# Patient Record
Sex: FEMALE | Race: WHITE | NOT HISPANIC OR LATINO | Employment: OTHER | URBAN - METROPOLITAN AREA
[De-identification: names, ages, dates, MRNs, and addresses within clinical notes are randomized per-mention and may not be internally consistent; named-entity substitution may affect disease eponyms.]

---

## 2024-04-08 ENCOUNTER — HOSPITAL ENCOUNTER (EMERGENCY)
Facility: HOSPITAL | Age: 24
Discharge: HOME | End: 2024-04-08
Attending: STUDENT IN AN ORGANIZED HEALTH CARE EDUCATION/TRAINING PROGRAM | Admitting: STUDENT IN AN ORGANIZED HEALTH CARE EDUCATION/TRAINING PROGRAM
Payer: COMMERCIAL

## 2024-04-08 VITALS
HEIGHT: 62 IN | DIASTOLIC BLOOD PRESSURE: 73 MMHG | RESPIRATION RATE: 18 BRPM | OXYGEN SATURATION: 100 % | BODY MASS INDEX: 23.85 KG/M2 | TEMPERATURE: 98.4 F | HEART RATE: 99 BPM | WEIGHT: 129.63 LBS | SYSTOLIC BLOOD PRESSURE: 110 MMHG

## 2024-04-08 DIAGNOSIS — R55 VASOVAGAL SYNCOPE: Primary | ICD-10-CM

## 2024-04-08 DIAGNOSIS — N39.0 ACUTE UTI: ICD-10-CM

## 2024-04-08 LAB
ALBUMIN SERPL-MCNC: 4.2 G/DL (ref 3.5–5.2)
ALBUMIN/GLOB SERPL: 1 G/DL
ALP SERPL-CCNC: 82 U/L (ref 39–117)
ALT SERPL W P-5'-P-CCNC: 14 U/L (ref 1–33)
ANION GAP SERPL CALCULATED.3IONS-SCNC: 9 MMOL/L (ref 5–15)
AST SERPL-CCNC: 21 U/L (ref 1–32)
B-HCG UR QL: NEGATIVE
BACTERIA UR QL AUTO: ABNORMAL /HPF
BASOPHILS # BLD AUTO: 0.02 10*3/MM3 (ref 0–0.2)
BASOPHILS NFR BLD AUTO: 0.2 % (ref 0–1.5)
BILIRUB SERPL-MCNC: 0.2 MG/DL (ref 0–1.2)
BILIRUB UR QL STRIP: NEGATIVE
BUN SERPL-MCNC: 15 MG/DL (ref 6–20)
BUN/CREAT SERPL: 20.8 (ref 7–25)
CALCIUM SPEC-SCNC: 9.3 MG/DL (ref 8.6–10.5)
CHLORIDE SERPL-SCNC: 102 MMOL/L (ref 98–107)
CLARITY UR: ABNORMAL
CO2 SERPL-SCNC: 26 MMOL/L (ref 22–29)
COLOR UR: YELLOW
CREAT SERPL-MCNC: 0.72 MG/DL (ref 0.57–1)
DEPRECATED RDW RBC AUTO: 54.4 FL (ref 37–54)
EGFRCR SERPLBLD CKD-EPI 2021: 119.9 ML/MIN/1.73
EOSINOPHIL # BLD AUTO: 0.03 10*3/MM3 (ref 0–0.4)
EOSINOPHIL NFR BLD AUTO: 0.4 % (ref 0.3–6.2)
ERYTHROCYTE [DISTWIDTH] IN BLOOD BY AUTOMATED COUNT: 16.4 % (ref 12.3–15.4)
EXPIRATION DATE: NORMAL
GLOBULIN UR ELPH-MCNC: 4.3 GM/DL
GLUCOSE SERPL-MCNC: 97 MG/DL (ref 65–99)
GLUCOSE UR STRIP-MCNC: NEGATIVE MG/DL
HCT VFR BLD AUTO: 40.3 % (ref 34–46.6)
HGB BLD-MCNC: 12.8 G/DL (ref 12–15.9)
HGB UR QL STRIP.AUTO: ABNORMAL
HYALINE CASTS UR QL AUTO: ABNORMAL /LPF
IMM GRANULOCYTES # BLD AUTO: 0.03 10*3/MM3 (ref 0–0.05)
IMM GRANULOCYTES NFR BLD AUTO: 0.4 % (ref 0–0.5)
INTERNAL NEGATIVE CONTROL: NEGATIVE
INTERNAL POSITIVE CONTROL: POSITIVE
KETONES UR QL STRIP: NEGATIVE
LEUKOCYTE ESTERASE UR QL STRIP.AUTO: ABNORMAL
LYMPHOCYTES # BLD AUTO: 0.59 10*3/MM3 (ref 0.7–3.1)
LYMPHOCYTES NFR BLD AUTO: 7 % (ref 19.6–45.3)
Lab: NORMAL
MCH RBC QN AUTO: 28.6 PG (ref 26.6–33)
MCHC RBC AUTO-ENTMCNC: 31.8 G/DL (ref 31.5–35.7)
MCV RBC AUTO: 90 FL (ref 79–97)
MONOCYTES # BLD AUTO: 0.41 10*3/MM3 (ref 0.1–0.9)
MONOCYTES NFR BLD AUTO: 4.8 % (ref 5–12)
NEUTROPHILS NFR BLD AUTO: 7.39 10*3/MM3 (ref 1.7–7)
NEUTROPHILS NFR BLD AUTO: 87.2 % (ref 42.7–76)
NITRITE UR QL STRIP: NEGATIVE
NRBC BLD AUTO-RTO: 0 /100 WBC (ref 0–0.2)
PH UR STRIP.AUTO: 5.5 [PH] (ref 5–8)
PLATELET # BLD AUTO: 484 10*3/MM3 (ref 140–450)
PMV BLD AUTO: 10.2 FL (ref 6–12)
POTASSIUM SERPL-SCNC: 4.6 MMOL/L (ref 3.5–5.2)
PROT SERPL-MCNC: 8.5 G/DL (ref 6–8.5)
PROT UR QL STRIP: ABNORMAL
RBC # BLD AUTO: 4.48 10*6/MM3 (ref 3.77–5.28)
RBC # UR STRIP: ABNORMAL /HPF
REF LAB TEST METHOD: ABNORMAL
SODIUM SERPL-SCNC: 137 MMOL/L (ref 136–145)
SP GR UR STRIP: 1.02 (ref 1–1.03)
SQUAMOUS #/AREA URNS HPF: ABNORMAL /HPF
TROPONIN T SERPL HS-MCNC: <6 NG/L
UROBILINOGEN UR QL STRIP: ABNORMAL
WBC # UR STRIP: ABNORMAL /HPF
WBC NRBC COR # BLD AUTO: 8.47 10*3/MM3 (ref 3.4–10.8)

## 2024-04-08 PROCEDURE — 99283 EMERGENCY DEPT VISIT LOW MDM: CPT

## 2024-04-08 PROCEDURE — 84484 ASSAY OF TROPONIN QUANT: CPT | Performed by: PHYSICIAN ASSISTANT

## 2024-04-08 PROCEDURE — 25810000003 SODIUM CHLORIDE 0.9 % SOLUTION: Performed by: PHYSICIAN ASSISTANT

## 2024-04-08 PROCEDURE — 85025 COMPLETE CBC W/AUTO DIFF WBC: CPT | Performed by: PHYSICIAN ASSISTANT

## 2024-04-08 PROCEDURE — 81025 URINE PREGNANCY TEST: CPT | Performed by: PHYSICIAN ASSISTANT

## 2024-04-08 PROCEDURE — 80053 COMPREHEN METABOLIC PANEL: CPT | Performed by: PHYSICIAN ASSISTANT

## 2024-04-08 PROCEDURE — 81001 URINALYSIS AUTO W/SCOPE: CPT | Performed by: PHYSICIAN ASSISTANT

## 2024-04-08 PROCEDURE — 93005 ELECTROCARDIOGRAM TRACING: CPT | Performed by: PHYSICIAN ASSISTANT

## 2024-04-08 RX ORDER — ONDANSETRON 4 MG/1
4 TABLET, ORALLY DISINTEGRATING ORAL EVERY 8 HOURS PRN
Qty: 12 TABLET | Refills: 0 | Status: SHIPPED | OUTPATIENT
Start: 2024-04-08

## 2024-04-08 RX ORDER — SODIUM CHLORIDE 0.9 % (FLUSH) 0.9 %
10 SYRINGE (ML) INJECTION AS NEEDED
Status: DISCONTINUED | OUTPATIENT
Start: 2024-04-08 | End: 2024-04-08 | Stop reason: HOSPADM

## 2024-04-08 RX ORDER — CEFDINIR 300 MG/1
300 CAPSULE ORAL 2 TIMES DAILY
Qty: 20 CAPSULE | Refills: 0 | Status: SHIPPED | OUTPATIENT
Start: 2024-04-08

## 2024-04-08 RX ADMIN — SODIUM CHLORIDE 1000 ML: 9 INJECTION, SOLUTION INTRAVENOUS at 11:49

## 2024-04-08 NOTE — ED PROVIDER NOTES
Subjective   History of Present Illness  23-year-old female presents emergency department today with a syncopal episode.  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  She had previous syncopal episodes in the past.  Patient reports she got to get up to go to one of the couches and passed out again.    History provided by:  Patient   used: No    Syncope  Episode history:  Single  Most recent episode:  Today  Duration:  30 seconds  Progression:  Resolved  Chronicity:  New  Context comment:  Got nauseated had a syncopal episode.  Tried to get up too quickly and passed out again.  Witnessed: no    Relieved by:  Lying down  Worsened by:  Nothing  Ineffective treatments:  None tried  Associated symptoms: nausea    Associated symptoms: no anxiety, no headaches and no vomiting    Risk factors: no congenital heart disease, no coronary artery disease and no vascular disease        Review of Systems   Cardiovascular:  Positive for syncope.   Gastrointestinal:  Positive for nausea. Negative for vomiting.   Neurological:  Negative for headaches.       No past medical history on file.    No Known Allergies    No past surgical history on file.    No family history on file.    Social History     Socioeconomic History    Marital status: Single           Objective   Physical Exam  Vitals and nursing note reviewed.   Constitutional:       General: She is not in acute distress.     Appearance: She is well-developed. She is not diaphoretic.   HENT:      Head: Normocephalic and atraumatic.      Nose: Nose normal.   Eyes:      General: No scleral icterus.     Conjunctiva/sclera: Conjunctivae normal.   Cardiovascular:      Rate and Rhythm: Normal rate and regular rhythm.      Heart sounds: Normal heart sounds. No murmur heard.  Pulmonary:      Effort: Pulmonary effort is normal. No respiratory distress.       Breath sounds: Normal breath sounds.   Abdominal:      General: Bowel sounds are normal.      Palpations: Abdomen is soft.      Tenderness: There is no abdominal tenderness.   Musculoskeletal:         General: Normal range of motion.      Cervical back: Normal range of motion and neck supple.   Skin:     General: Skin is warm and dry.   Neurological:      Mental Status: She is alert and oriented to person, place, and time.   Psychiatric:         Behavior: Behavior normal.         Procedures           ED Course  ED Course as of 04/09/24 2113 Tue Apr 09, 2024 2112 Laboratory data patient's white count was 8.47 with an H&H of 13 and 40.  Platelet count of 484.  CMP glucose of 87 BUN of 15 creatinine 1.72 sodium 137 and a potassium of 4.6.  Liver functions ALT 14 AST of 21 alk phos 82 and total bilirubin 0.2.  Urinalysis did appear to have a UTI.  She had leukocyte Estrace TNTC WBCs +2 bacteria 3-5 RBCs and only 7-12 epithelials.  Urine cultures pending.  Urine hCG was negative.  EKG revealed normal sinus rhythm. [TILA]   2113 She appears to have an acute UTI.  Also appears to have vasovagal syncope.  She feels much improved like to be discharged home started on oral antibiotics. [TILA]      ED Course User Index  [TILA] Ilya Stroud PA                                             Medical Decision Making  Problems Addressed:  Acute UTI: complicated acute illness or injury  Vasovagal syncope: complicated acute illness or injury    Amount and/or Complexity of Data Reviewed  Labs: ordered.  ECG/medicine tests: ordered.    Risk  Prescription drug management.        Final diagnoses:   Vasovagal syncope   Acute UTI       ED Disposition  ED Disposition       ED Disposition   Discharge    Condition   Stable    Comment   --               PATIENT CONNECTION - [facility] [id number] [phone]    Call for appointment         Medication List        New Prescriptions      cefdinir 300 MG capsule  Commonly  known as: OMNICEF  Take 1 capsule by mouth 2 (Two) Times a Day.     ondansetron ODT 4 MG disintegrating tablet  Commonly known as: ZOFRAN-ODT  Place 1 tablet on the tongue Every 8 (Eight) Hours As Needed for Nausea.               Where to Get Your Medications        These medications were sent to [id number] - [place], [place] - [address] AT [facility] [address] [facility] [phone]  - [phone]   [address] [facility] [address]      Phone: [phone]   cefdinir 300 MG capsule  ondansetron ODT 4 MG disintegrating tablet            Ilya Stroud PA  04/09/24 [id number]

## 2024-04-09 LAB
QT INTERVAL: 374 MS
QTC INTERVAL: 457 MS

## 2024-07-10 ENCOUNTER — TELEMEDICINE (OUTPATIENT)
Dept: FAMILY MEDICINE CLINIC | Facility: TELEHEALTH | Age: 24
End: 2024-07-10
Payer: COMMERCIAL

## 2024-07-10 DIAGNOSIS — L30.9 ECZEMA OF SCALP: Primary | ICD-10-CM

## 2024-07-10 PROBLEM — M95.0 ACQUIRED DEFORMITY OF NOSE: Status: ACTIVE | Noted: 2023-03-06

## 2024-07-10 PROBLEM — F41.9 MODERATE ANXIETY: Chronic | Status: ACTIVE | Noted: 2022-10-13

## 2024-07-10 PROBLEM — R76.0 ANTIPHOSPHOLIPID ANTIBODY POSITIVE: Status: ACTIVE | Noted: 2017-02-07

## 2024-07-10 PROBLEM — M32.9 LUPUS: Status: ACTIVE | Noted: 2024-07-10

## 2024-07-10 PROBLEM — IMO0002 LUPUS: Status: ACTIVE | Noted: 2024-07-10

## 2024-07-10 PROBLEM — R41.840 LACK OF CONCENTRATION: Status: ACTIVE | Noted: 2022-10-13

## 2024-07-10 PROBLEM — S02.2XXA CLOSED FRACTURE OF NASAL BONES: Status: ACTIVE | Noted: 2023-07-19

## 2024-07-10 PROBLEM — Z01.419 WELL WOMAN EXAM: Status: ACTIVE | Noted: 2022-10-13

## 2024-07-10 PROBLEM — J34.2 DEVIATED NASAL SEPTUM: Status: ACTIVE | Noted: 2023-03-06

## 2024-07-10 PROBLEM — M35.9 UNDIFFERENTIATED CONNECTIVE TISSUE DISEASE: Status: ACTIVE | Noted: 2017-01-23

## 2024-07-10 PROBLEM — R76.8 ANA POSITIVE: Status: ACTIVE | Noted: 2017-01-23

## 2024-07-10 PROCEDURE — 99213 OFFICE O/P EST LOW 20 MIN: CPT | Performed by: NURSE PRACTITIONER

## 2024-07-10 RX ORDER — KETOCONAZOLE 20 MG/ML
SHAMPOO TOPICAL WEEKLY
COMMUNITY
Start: 2024-06-29

## 2024-07-10 RX ORDER — HYDROXYCHLOROQUINE SULFATE 200 MG/1
200 TABLET, FILM COATED ORAL DAILY
COMMUNITY
End: 2024-07-10

## 2024-07-10 RX ORDER — CLOTRIMAZOLE 10 MG/G
OINTMENT TOPICAL
COMMUNITY
Start: 2024-06-12 | End: 2024-07-10

## 2024-07-10 RX ORDER — METRONIDAZOLE 500 MG/1
500 TABLET ORAL 2 TIMES DAILY
COMMUNITY
Start: 2024-07-08 | End: 2024-07-15

## 2024-07-10 NOTE — PROGRESS NOTES
CHIEF COMPLAINT  Chief Complaint   Patient presents with    Rash         HPI  Nataly Olivier is a 24 y.o. female  presents with complaint of eczema of her scalp that is painful and oozing. She has been using betamethasone cream, but ran out. The ketoconazole shampoo is not helping.   She has a history of eczema.   She has an appointment to see her dermatologist 8/26 and cannot wait that long for treatment. The bottle she had gotten before was very small.     Review of Systems   Skin:  Positive for rash (scalp).       No past medical history on file.    No family history on file.    Social History     Socioeconomic History    Marital status: Single         There were no vitals taken for this visit.    PHYSICAL EXAM  Physical Exam   Constitutional: She is oriented to person, place, and time. She appears well-developed and well-nourished. She does not have a sickly appearance. She does not appear ill. No distress.   HENT:   Head: Normocephalic and atraumatic.   Eyes: EOM are normal.   Neck: Neck normal appearance.  Pulmonary/Chest: Effort normal.  No respiratory distress.  Neurological: She is alert and oriented to person, place, and time.   Skin: Skin is dry. Rash (scalp) noted.   Psychiatric: She has a normal mood and affect.           Diagnoses and all orders for this visit:    1. Eczema of scalp (Primary)    Other orders  -     betamethasone valerate (VALISONE) 0.1 % ointment; Apply  topically to the appropriate area as directed 2 (Two) Times a Day.  Dispense: 45 g; Refill: 1    Dermatology follow up 8/26    The use of a video visit has been reviewed with the patient and verbal informed consent has been obtained. Myself and Nataly Olivier participated in this visit. The patient is located in [address] [place] [address]. I am located in [place], [place]. Mychart and [employer] were utilized.       Note Disclaimer: At [facility], we believe that sharing information builds trust and better    relationships. You are receiving this note because you recently visited [facility]. It is possible you   will see health information before a provider has talked with you about it. This kind of information can   be easy to misunderstand. To help you fully understand what it means for your health, we urge you to   discuss this note with your provider.    Bertha Salazar, DEVON  07/10/2024  13:00 EDT

## 2024-07-10 NOTE — PATIENT INSTRUCTIONS
Follow up with your dermatologist for further treatment   May use the ketoconazole shampoo and betamethasone cream together  If symptoms worsen before seeing dermatology, follow up at urgent care or with your primary care provider       Clinical References    Seborrheic Dermatitis, Adult  Seborrheic dermatitis is a skin disease that causes red, scaly patches. It often occurs on the scalp, where it may be called dandruff. The patches may also appear on other parts of the body. Skin patches tend to occur where there are a lot of oil glands in the skin. Areas of the body that may be affected include:  The scalp.  The face, eyebrows, and ears.  The area around a beard.  Skin folds of the body. This includes the armpits, groin, and buttocks.  The chest.  The condition is often long-lasting (chronic). It may come and go for no known reason. It may be activated by a trigger, such as:  Cold weather.  Being out in the sun.  Stress.  Drinking alcohol.  What are the causes?  The cause of this condition is not known. It may be related to having too much yeast on the skin or changes in how your body's disease-fighting system (immune system) works.  What increases the risk?  You may be more likely to develop this condition if:  You have a weak immune system.  You are 50 years old or older.  You have other conditions, such as:  Human immunodeficiency virus (HIV) or acquired immunodeficiency virus (AIDS).  Parkinson's disease.  Mood disorders, such as depression.  Liver problems.  Obesity.  What are the signs or symptoms?  Symptoms of this condition include:  Thick scales on the scalp.  Redness on the face or in the armpits.  Skin that is flaky. The flakes may be white or yellow.  Skin that seems oily or dry but is not helped with moisturizers.  Itching or burning in the affected areas.  How is this diagnosed?  This condition is diagnosed with a medical history and physical exam. A sample of your skin may be tested (skin biopsy).  You may need to see a skin specialist (dermatologist).  How is this treated?  There is no cure for this condition, but treatment can help to manage the symptoms. You may get treatment to remove scales, lower the risk of skin infection, and reduce swelling or itching. Treatment may include:  Medicated shampoos, moisturizing creams, or ointments.  Creams that reduce skin yeast.  Creams that reduce swelling and irritation (steroids).  Follow these instructions at home:  Skin care  Use any medicated shampoo, skin creams, or ointments only as told by your health care provider.  Do not use skin products that contain alcohol.  Take lukewarm baths or showers. Avoid very hot water.  When you are outside, wear a hat and clothes that block UV light.  General instructions  Apply over-the-counter and prescription medicines only as told by your health care provider.  Learn what triggers your symptoms so you can avoid these things.  Use techniques for stress reduction, such as meditation or yoga.  Do not drink alcohol if your health care provider tells you not to drink.  Keep all follow-up visits. Your health care provider will check your skin to make sure the treatments are helping.  Where to find more information  American Academy of Dermatology: aad.org  Contact a health care provider if:  Your symptoms do not get better with treatment.  Your symptoms get worse.  You have new symptoms.  Get help right away if:  Your condition quickly gets worse, even with treatment.  This information is not intended to replace advice given to you by your health care provider. Make sure you discuss any questions you have with your health care provider.  Document Revised: 05/19/2023 Document Reviewed: 05/19/2023  [employer] Patient Education © 2024 [employer]     Eczema  Eczema refers to a group of skin conditions that cause skin to become rough and inflamed. Each type of eczema has different triggers, symptoms, and treatments. Eczema of any  type is usually itchy. Symptoms range from mild to severe.  Eczema is not spread from person to person (is not contagious). It can appear on different parts of the body at different times. One person's eczema may look different from another person's eczema.  What are the causes?  The exact cause of this condition is not known. However, exposure to certain environmental factors, irritants, and allergens can make the condition worse.  What are the signs or symptoms?  Symptoms of this condition depend on the type of eczema you have. The types include:  Contact dermatitis. There are two kinds:  Irritant contact dermatitis. This happens when something irritates the skin and causes a rash.  Allergic contact dermatitis. This happens when your skin comes in contact with something you are allergic to (allergens). This can include poison ivy, chemicals, or medicines that were applied to your skin.  Atopic dermatitis. This is a long-term (chronic) skin disease that keeps coming back (recurring). It is the most common type of eczema. Usual symptoms are a red rash and itchy, dry, scaly skin. It usually starts showing signs in infancy and can last through adulthood.  Dyshidrotic eczema. This is a form of eczema on the hands and feet. It shows up as very itchy, fluid-filled blisters. It can affect people of any age but is more common before age 40.  Hand eczema. This causes very itchy areas of skin on the palms and sides of the hands and fingers. This type of eczema is common in industrial jobs where you may be exposed to different types of irritants.  Lichen simplex chronicus. This type of eczema occurs when a person constantly scratches one area of the body. Repeated scratching of the area leads to thickened skin (lichenification). This condition can accompany other types of eczema. It is more common in adults but may also be seen in children.  Nummular eczema. This is a common type of eczema that most often affects the lower  legs and the backs of the hands. It typically causes an itchy, red, circular, crusty lesion (plaque). Scratching may become a habit and can cause bleeding. Nummular eczema occurs most often in middle-aged or older people.  Seborrheic dermatitis. This is a common skin disease that mainly affects the scalp. It may also affect other oily areas of the body, such as the face, sides of the nose, eyebrows, ears, eyelids, and chest. It is marked by small scaling and redness of the skin (erythema). This can affect people of all ages. In infants, this condition is called cradle cap.  Stasis dermatitis. This is a common skin disease that can cause itching, scaling, and hyperpigmentation, usually on the legs and feet. It occurs most often in people who have a condition that prevents blood from being pumped through the veins in the legs (chronic venous insufficiency). Stasis dermatitis is a chronic condition that needs long-term management.  How is this diagnosed?  This condition may be diagnosed based on:  A physical exam of your skin.  Your medical history.  Skin patch tests. These tests involve using patches that contain possible allergens and placing them on your back. Your health care provider will check in a few days to see if an allergic reaction occurred.  How is this treated?  Treatment for eczema is based on the type of eczema you have. You may be given hydrocortisone steroid medicine or antihistamines. These can relieve itching quickly and help reduce inflammation. These may be prescribed or purchased over the counter, depending on the strength that is needed.  Follow these instructions at home:  Take or apply over-the-counter and prescription medicines only as told by your health care provider.  Use creams or ointments to moisturize your skin. Do not use lotions.  Learn what triggers or irritates your symptoms so you can avoid these things.  Treat symptom flare-ups quickly.  Do not scratch your skin. This can make  your rash worse.  Keep all follow-up visits. This is important.  Where to find more information  American Academy of Dermatology: aad.org  National Eczema Association: nationaleczema.org  The Society for Pediatric Dermatology: pedsderm.net  Contact a health care provider if:  You have severe itching, even with treatment.  You scratch your skin regularly until it bleeds.  Your rash looks different than usual.  Your skin is painful, swollen, or more red than usual.  You have a fever.  Summary  Eczema refers to a group of skin conditions that cause skin to become rough and inflamed. Each type has different triggers.  Eczema of any type causes itching that may range from mild to severe.  Treatment varies based on the type of eczema you have. Hydrocortisone steroid medicine or antihistamines can help with itching and inflammation.  Protecting your skin is the best way to prevent eczema. Use creams or ointments to moisturize your skin. Avoid triggers and irritants. Treat flare-ups quickly.  This information is not intended to replace advice given to you by your health care provider. Make sure you discuss any questions you have with your health care provider.  Document Revised: 09/24/2021 Document Reviewed: 09/27/2021  Elsevier Patient Education © 2024 [employer] Inc.

## 2024-09-05 ENCOUNTER — TELEMEDICINE (OUTPATIENT)
Dept: FAMILY MEDICINE CLINIC | Facility: TELEHEALTH | Age: 24
End: 2024-09-05
Payer: COMMERCIAL

## 2024-09-05 DIAGNOSIS — N89.8 VAGINAL DISCHARGE: Primary | ICD-10-CM

## 2024-09-05 RX ORDER — METRONIDAZOLE 500 MG/1
500 TABLET ORAL 2 TIMES DAILY
Qty: 14 TABLET | Refills: 0 | Status: SHIPPED | OUTPATIENT
Start: 2024-09-05 | End: 2024-09-12

## 2024-09-05 NOTE — PROGRESS NOTES
HPI  Nataly Olivier is a 24 y.o. female  presents with complaint of BV symptoms including vaginal itching and discharge. Has history of BV and this feel similar. Denies fever, chills, sweats, risk of STI, pregnancy.    Review of Systems    No past medical history on file.    No family history on file.    Social History     Socioeconomic History    Marital status: Single   Tobacco Use    Smoking status: Never    Smokeless tobacco: Never   Vaping Use    Vaping status: Never Used         There were no vitals taken for this visit.    PHYSICAL EXAM  Physical Exam   Constitutional: She appears well-developed and well-nourished.   HENT:   Head: Normocephalic.   Nose: Nose normal.   Neck: Neck normal appearance.  Pulmonary/Chest: Effort normal.   Neurological: She is alert.   Psychiatric: She has a normal mood and affect. Her speech is normal.       Diagnoses and all orders for this visit:    1. Vaginal discharge (Primary)  -     metroNIDAZOLE (Flagyl) 500 MG tablet; Take 1 tablet by mouth 2 (Two) Times a Day for 7 days.  Dispense: 14 tablet; Refill: 0          FOLLOW-UP  As discussed during visit with [facility], if symptoms worsen or fail to improve, follow-up with PCP/Urgent Care/Emergency Department.    Patient verbalizes understanding of medications, instructions for treatment and follow-up.    Luciana Love, DEVON  09/05/2024  11:01 EDT    The use of a video visit has been reviewed with the patient and verbal informed consent has been obtained. Myself and Nataly Olivier participated in this visit. The patient is located in [place], [place], and I am located in [place], [place]. [employer] and [employer] were utilized.

## 2024-09-23 ENCOUNTER — TELEMEDICINE (OUTPATIENT)
Dept: FAMILY MEDICINE CLINIC | Facility: TELEHEALTH | Age: 24
End: 2024-09-23
Payer: COMMERCIAL

## 2024-09-23 DIAGNOSIS — H66.001 ACUTE SUPPURATIVE OTITIS MEDIA OF RIGHT EAR WITHOUT SPONTANEOUS RUPTURE OF TYMPANIC MEMBRANE, RECURRENCE NOT SPECIFIED: Primary | ICD-10-CM

## 2024-09-23 PROCEDURE — 99213 OFFICE O/P EST LOW 20 MIN: CPT | Performed by: NURSE PRACTITIONER

## 2024-09-23 RX ORDER — OFLOXACIN 3 MG/ML
5 SOLUTION/ DROPS OPHTHALMIC 2 TIMES DAILY
Qty: 5 ML | Refills: 0 | Status: SHIPPED | OUTPATIENT
Start: 2024-09-23

## 2024-09-23 RX ORDER — FLUOCINOLONE ACETONIDE 0.11 MG/ML
OIL TOPICAL
COMMUNITY
Start: 2024-09-17